# Patient Record
Sex: MALE | URBAN - METROPOLITAN AREA
[De-identification: names, ages, dates, MRNs, and addresses within clinical notes are randomized per-mention and may not be internally consistent; named-entity substitution may affect disease eponyms.]

---

## 2020-07-21 ENCOUNTER — APPOINTMENT (RX ONLY)
Dept: URBAN - METROPOLITAN AREA CLINIC 152 | Facility: CLINIC | Age: 80
Setting detail: DERMATOLOGY
End: 2020-07-21

## 2020-07-21 DIAGNOSIS — L73.8 OTHER SPECIFIED FOLLICULAR DISORDERS: ICD-10-CM

## 2020-07-21 PROBLEM — D23.112 OTHER BENIGN NEOPLASM OF SKIN OF RIGHT LOWER EYELID, INCLUDING CANTHUS: Status: ACTIVE | Noted: 2020-07-21

## 2020-07-21 PROBLEM — D23.5 OTHER BENIGN NEOPLASM OF SKIN OF TRUNK: Status: ACTIVE | Noted: 2020-07-21

## 2020-07-21 PROCEDURE — ? COUNSELING

## 2020-07-21 PROCEDURE — ? DIAGNOSIS COMMENT

## 2020-07-21 PROCEDURE — 99203 OFFICE O/P NEW LOW 30 MIN: CPT

## 2020-07-21 PROCEDURE — ? OBSERVATION

## 2020-07-21 ASSESSMENT — LOCATION DETAILED DESCRIPTION DERM: LOCATION DETAILED: LEFT SUBMANDIBULAR AREA

## 2020-07-21 ASSESSMENT — LOCATION ZONE DERM: LOCATION ZONE: FACE

## 2020-07-21 ASSESSMENT — LOCATION SIMPLE DESCRIPTION DERM: LOCATION SIMPLE: LEFT SUBMANDIBULAR AREA

## 2020-07-21 NOTE — PROCEDURE: OBSERVATION
Detail Level: Detailed
Size Of Lesion In Cm (Optional): 0
Morphology Per Location (Optional): Dome shape papule, pt has always had this

## 2020-07-21 NOTE — HPI: EVALUATION OF SKIN LESION(S)
What Type Of Note Output Would You Prefer (Optional)?: Standard Output
Hpi Title: Evaluation of Skin Lesions
How Severe Are Your Spot(S)?: mild
Have Your Spot(S) Been Treated In The Past?: has not been treated
Additional History: Pt has had lesions treated in the past

## 2022-04-22 ENCOUNTER — APPOINTMENT (RX ONLY)
Dept: URBAN - METROPOLITAN AREA CLINIC 152 | Facility: CLINIC | Age: 82
Setting detail: DERMATOLOGY
End: 2022-04-22

## 2022-04-22 DIAGNOSIS — D17 BENIGN LIPOMATOUS NEOPLASM: ICD-10-CM

## 2022-04-22 DIAGNOSIS — L73.8 OTHER SPECIFIED FOLLICULAR DISORDERS: ICD-10-CM

## 2022-04-22 PROBLEM — D23.5 OTHER BENIGN NEOPLASM OF SKIN OF TRUNK: Status: ACTIVE | Noted: 2022-04-22

## 2022-04-22 PROBLEM — D17.1 BENIGN LIPOMATOUS NEOPLASM OF SKIN AND SUBCUTANEOUS TISSUE OF TRUNK: Status: ACTIVE | Noted: 2022-04-22

## 2022-04-22 PROBLEM — D23.112 OTHER BENIGN NEOPLASM OF SKIN OF RIGHT LOWER EYELID, INCLUDING CANTHUS: Status: ACTIVE | Noted: 2022-04-22

## 2022-04-22 PROCEDURE — ? PRESCRIPTION MEDICATION MANAGEMENT

## 2022-04-22 PROCEDURE — ? COUNSELING

## 2022-04-22 PROCEDURE — ? DIAGNOSIS COMMENT

## 2022-04-22 PROCEDURE — 99214 OFFICE O/P EST MOD 30 MIN: CPT

## 2022-04-22 PROCEDURE — ? OBSERVATION

## 2022-04-22 ASSESSMENT — LOCATION ZONE DERM
LOCATION ZONE: FACE
LOCATION ZONE: TRUNK

## 2022-04-22 ASSESSMENT — LOCATION SIMPLE DESCRIPTION DERM
LOCATION SIMPLE: LEFT SUBMANDIBULAR AREA
LOCATION SIMPLE: ABDOMEN

## 2022-04-22 ASSESSMENT — LOCATION DETAILED DESCRIPTION DERM
LOCATION DETAILED: LEFT SUBMANDIBULAR AREA
LOCATION DETAILED: PERIUMBILICAL SKIN

## 2022-04-22 NOTE — PROCEDURE: DIAGNOSIS COMMENT
Comment: FBSE - Benign findings today. Counseled pt on dilated pore to the LT submandibular area. 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 F/u in 1 year for skin check.
Detail Level: Simple

## 2023-04-21 ENCOUNTER — APPOINTMENT (RX ONLY)
Dept: URBAN - METROPOLITAN AREA CLINIC 152 | Facility: CLINIC | Age: 83
Setting detail: DERMATOLOGY
End: 2023-04-21

## 2023-04-21 DIAGNOSIS — D18.0 HEMANGIOMA: ICD-10-CM

## 2023-04-21 DIAGNOSIS — L57.8 OTHER SKIN CHANGES DUE TO CHRONIC EXPOSURE TO NONIONIZING RADIATION: ICD-10-CM

## 2023-04-21 DIAGNOSIS — L72.8 OTHER FOLLICULAR CYSTS OF THE SKIN AND SUBCUTANEOUS TISSUE: ICD-10-CM

## 2023-04-21 DIAGNOSIS — L82.1 OTHER SEBORRHEIC KERATOSIS: ICD-10-CM

## 2023-04-21 DIAGNOSIS — D22 MELANOCYTIC NEVI: ICD-10-CM

## 2023-04-21 DIAGNOSIS — L57.0 ACTINIC KERATOSIS: ICD-10-CM

## 2023-04-21 PROBLEM — D23.5 OTHER BENIGN NEOPLASM OF SKIN OF TRUNK: Status: ACTIVE | Noted: 2023-04-21

## 2023-04-21 PROBLEM — D18.01 HEMANGIOMA OF SKIN AND SUBCUTANEOUS TISSUE: Status: ACTIVE | Noted: 2023-04-21

## 2023-04-21 PROBLEM — D22.62 MELANOCYTIC NEVI OF LEFT UPPER LIMB, INCLUDING SHOULDER: Status: ACTIVE | Noted: 2023-04-21

## 2023-04-21 PROCEDURE — 17000 DESTRUCT PREMALG LESION: CPT

## 2023-04-21 PROCEDURE — ? LIQUID NITROGEN

## 2023-04-21 PROCEDURE — ? COUNSELING

## 2023-04-21 PROCEDURE — ? PRESCRIPTION MEDICATION MANAGEMENT

## 2023-04-21 PROCEDURE — ? DIAGNOSIS COMMENT

## 2023-04-21 PROCEDURE — 99214 OFFICE O/P EST MOD 30 MIN: CPT | Mod: 25

## 2023-04-21 PROCEDURE — ? SEPARATE AND IDENTIFIABLE DOCUMENTATION

## 2023-04-21 ASSESSMENT — LOCATION DETAILED DESCRIPTION DERM
LOCATION DETAILED: LEFT SUPERIOR LATERAL BUCCAL CHEEK
LOCATION DETAILED: NASAL DORSUM
LOCATION DETAILED: LEFT VENTRAL PROXIMAL FOREARM
LOCATION DETAILED: LEFT MEDIAL INFERIOR CHEST
LOCATION DETAILED: LEFT MEDIAL SUPERIOR CHEST

## 2023-04-21 ASSESSMENT — LOCATION SIMPLE DESCRIPTION DERM
LOCATION SIMPLE: NOSE
LOCATION SIMPLE: LEFT FOREARM
LOCATION SIMPLE: CHEST
LOCATION SIMPLE: LEFT CHEEK

## 2023-04-21 ASSESSMENT — LOCATION ZONE DERM
LOCATION ZONE: ARM
LOCATION ZONE: TRUNK
LOCATION ZONE: FACE
LOCATION ZONE: NOSE

## 2023-04-21 NOTE — PROCEDURE: DIAGNOSIS COMMENT
Comment: FBSE- Pt has not had any cardiac arrest so far.  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 Discussed briefly of LN2 treatment on AK. Reassure that cyst on face is benign. F/u next year for FBSE

## 2023-04-21 NOTE — PROCEDURE: LIQUID NITROGEN
Number Of Freeze-Thaw Cycles: 1 freeze-thaw cycle
Render Note In Bullet Format When Appropriate: No
Post-Care Instructions: I reviewed post-care instructions with the patient in detail: pt should expect treated lesion(s) to form a scab for ~1 week, pt may apply Vaseline or Aquaphor to crusted or scabbing areas if desired. Advised to monitor and return to clinic for re-treatment or biopsy if lesion(s) remain unresolved in ~2 months.
Render Post-Care Instructions In Note?: yes
Consent: The patient's verbal consent was obtained including but not limited to risks of crusting, scabbing, blistering, scarring, darker or lighter pigmentary change, recurrence, incomplete removal and infection.
Detail Level: Detailed

## 2023-04-21 NOTE — PROCEDURE: MIPS QUALITY
Quality 130: Documentation Of Current Medications In The Medical Record: Current Medications Documented
Detail Level: Detailed
Quality 110: Preventive Care And Screening: Influenza Immunization: Influenza Immunization Administered during Influenza season
Quality 226: Preventive Care And Screening: Tobacco Use: Screening And Cessation Intervention: Tobacco Screening not Performed
Quality 93: Acute Otitis Externa (Aoe): Systemic Antimicrobial Therapy - Avoidance Or Inappropriate Use: Physician did NOT prescribing a systemic antibiotic for AOE

## 2024-04-25 ENCOUNTER — APPOINTMENT (RX ONLY)
Dept: URBAN - METROPOLITAN AREA CLINIC 151 | Facility: CLINIC | Age: 84
Setting detail: DERMATOLOGY
End: 2024-04-25

## 2024-04-25 DIAGNOSIS — L82.1 OTHER SEBORRHEIC KERATOSIS: ICD-10-CM

## 2024-04-25 DIAGNOSIS — L81.4 OTHER MELANIN HYPERPIGMENTATION: ICD-10-CM

## 2024-04-25 DIAGNOSIS — L57.8 OTHER SKIN CHANGES DUE TO CHRONIC EXPOSURE TO NONIONIZING RADIATION: ICD-10-CM

## 2024-04-25 DIAGNOSIS — D18.0 HEMANGIOMA: ICD-10-CM

## 2024-04-25 PROBLEM — D23.5 OTHER BENIGN NEOPLASM OF SKIN OF TRUNK: Status: ACTIVE | Noted: 2024-04-25

## 2024-04-25 PROBLEM — D18.01 HEMANGIOMA OF SKIN AND SUBCUTANEOUS TISSUE: Status: ACTIVE | Noted: 2024-04-25

## 2024-04-25 PROCEDURE — ? COUNSELING

## 2024-04-25 PROCEDURE — ? DIAGNOSIS COMMENT

## 2024-04-25 PROCEDURE — 99213 OFFICE O/P EST LOW 20 MIN: CPT

## 2024-04-25 PROCEDURE — ? VISIT COMPLEXITY

## 2024-04-25 PROCEDURE — G2211 COMPLEX E/M VISIT ADD ON: HCPCS

## 2024-04-25 PROCEDURE — ? SUNSCREEN RECOMMENDATIONS

## 2024-04-25 ASSESSMENT — LOCATION SIMPLE DESCRIPTION DERM
LOCATION SIMPLE: RIGHT UPPER BACK
LOCATION SIMPLE: UPPER BACK

## 2024-04-25 ASSESSMENT — LOCATION DETAILED DESCRIPTION DERM
LOCATION DETAILED: RIGHT MEDIAL UPPER BACK
LOCATION DETAILED: INFERIOR THORACIC SPINE
LOCATION DETAILED: RIGHT MID-UPPER BACK
LOCATION DETAILED: RIGHT INFERIOR MEDIAL UPPER BACK

## 2024-04-25 ASSESSMENT — LOCATION ZONE DERM: LOCATION ZONE: TRUNK

## 2024-04-25 NOTE — PROCEDURE: DIAGNOSIS COMMENT
Render Risk Assessment In Note?: no
Detail Level: Generalized
Comment: Hx of AKs.\\nFBSE - Benign findings WNL.\\nF/U in 1 year for FBSE.